# Patient Record
Sex: FEMALE | Race: WHITE
[De-identification: names, ages, dates, MRNs, and addresses within clinical notes are randomized per-mention and may not be internally consistent; named-entity substitution may affect disease eponyms.]

---

## 2020-07-03 ENCOUNTER — HOSPITAL ENCOUNTER (EMERGENCY)
Dept: HOSPITAL 8 - ED | Age: 45
LOS: 1 days | Discharge: HOME | End: 2020-07-04
Payer: COMMERCIAL

## 2020-07-03 VITALS — WEIGHT: 143.3 LBS | HEIGHT: 68 IN | BODY MASS INDEX: 21.72 KG/M2
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
## 2020-07-03 DIAGNOSIS — T40.2X1A: Primary | ICD-10-CM

## 2020-07-03 DIAGNOSIS — G89.29: ICD-10-CM

## 2020-07-03 DIAGNOSIS — M54.9: ICD-10-CM

## 2020-07-03 LAB
ALBUMIN SERPL-MCNC: 3.5 G/DL (ref 3.4–5)
ALP SERPL-CCNC: 113 U/L (ref 45–117)
ALT SERPL-CCNC: 86 U/L (ref 12–78)
ANION GAP SERPL CALC-SCNC: 5 MMOL/L (ref 5–15)
BASOPHILS # BLD AUTO: 0.01 X10^3/UL (ref 0–0.1)
BASOPHILS NFR BLD AUTO: 0 % (ref 0–1)
BILIRUB SERPL-MCNC: 0.3 MG/DL (ref 0.2–1)
CALCIUM SERPL-MCNC: 7.6 MG/DL (ref 8.5–10.1)
CHLORIDE SERPL-SCNC: 108 MMOL/L (ref 98–107)
CREAT SERPL-MCNC: 0.91 MG/DL (ref 0.55–1.02)
EOSINOPHIL # BLD AUTO: 0.02 X10^3/UL (ref 0–0.4)
EOSINOPHIL NFR BLD AUTO: 0 % (ref 1–7)
ERYTHROCYTE [DISTWIDTH] IN BLOOD BY AUTOMATED COUNT: 13.6 % (ref 9.6–15.2)
LYMPHOCYTES # BLD AUTO: 0.56 X10^3/UL (ref 1–3.4)
LYMPHOCYTES NFR BLD AUTO: 9 % (ref 22–44)
MCH RBC QN AUTO: 33 PG (ref 27–34.8)
MCHC RBC AUTO-ENTMCNC: 33.7 G/DL (ref 32.4–35.8)
MCV RBC AUTO: 98.2 FL (ref 80–100)
MD: NO
MONOCYTES # BLD AUTO: 0.24 X10^3/UL (ref 0.2–0.8)
MONOCYTES NFR BLD AUTO: 4 % (ref 2–9)
NEUTROPHILS # BLD AUTO: 5.27 X10^3/UL (ref 1.8–6.8)
NEUTROPHILS NFR BLD AUTO: 86 % (ref 42–75)
PLATELET # BLD AUTO: 254 X10^3/UL (ref 130–400)
PMV BLD AUTO: 6.9 FL (ref 7.4–10.4)
PROT SERPL-MCNC: 6.7 G/DL (ref 6.4–8.2)
RBC # BLD AUTO: 3.82 X10^6/UL (ref 3.82–5.3)
VANCOMYCIN TROUGH SERPL-MCNC: < 1.7 MG/DL (ref 2.8–20)

## 2020-07-03 PROCEDURE — 80307 DRUG TEST PRSMV CHEM ANLYZR: CPT

## 2020-07-03 PROCEDURE — 36415 COLL VENOUS BLD VENIPUNCTURE: CPT

## 2020-07-03 PROCEDURE — 99285 EMERGENCY DEPT VISIT HI MDM: CPT

## 2020-07-03 PROCEDURE — 85025 COMPLETE CBC W/AUTO DIFF WBC: CPT

## 2020-07-03 PROCEDURE — 80053 COMPREHEN METABOLIC PANEL: CPT

## 2020-07-03 PROCEDURE — 93005 ELECTROCARDIOGRAM TRACING: CPT

## 2020-07-03 NOTE — NUR
FAMILY AT BEDSIDE PT RESTING ON [place] CONTINUES TO SPEAK IN FULL SENTENCES NO 
NEEDS AT THIS TIME, PT EDUCATED ON URINE SAMPLE. LAB AT BEDSIDE FOR DRAW AT 
THIS TIME

## 2020-07-03 NOTE — NUR
PT AMB TO RESTROOM WITH STEADY GAIT NO ASSIST REQ. PT PROVIDED URINE SAMPLE, 
SAMPLE TAKEN TO LAB AT THIS TIME.

## 2020-07-03 NOTE — NUR
THIS IS A 45Y F BIB EMS FROM HOME. PT TOOK ABOUT 30MG OF OXYCONTIN SHE GOT FROM 
A FRIEND, WENT INTO RESP ARREST. FAMILY WAS GIVING RESCUE BREATHES WHEN EMS 
ARRIVED. RA SATS IN THE 40'S, PT WAS BAGGED TO 99% AND GIVEN .75MG OF NARCAN. 
PT ARRIVED WITH SPONTANEOUS RESP AND SPEAKING IN FULL SENTENCES. PT CONNECTED 
TO ALL MONITORING PT A/OX4 NO NEEDS AT THIS TIME

## 2020-07-04 VITALS — DIASTOLIC BLOOD PRESSURE: 77 MMHG | SYSTOLIC BLOOD PRESSURE: 122 MMHG

## 2020-07-04 NOTE — NUR
SPOKE WITH PT ABOUT DC AND NEED FOR SOMEONE TO REMAIN WITH HER THROUGH THE 
NIGHT. PT STS SON IS ON THE WAY AND WILL WATCH HER CLOSELY. PT ALSO STS "I 
LEARNED MY LESSON TRUST ME NO MORE PILLS."